# Patient Record
Sex: FEMALE | Race: BLACK OR AFRICAN AMERICAN | HISPANIC OR LATINO | Employment: FULL TIME | URBAN - METROPOLITAN AREA
[De-identification: names, ages, dates, MRNs, and addresses within clinical notes are randomized per-mention and may not be internally consistent; named-entity substitution may affect disease eponyms.]

---

## 2020-03-05 ENCOUNTER — HOSPITAL ENCOUNTER (EMERGENCY)
Facility: HOSPITAL | Age: 30
Discharge: HOME | End: 2020-03-05
Attending: EMERGENCY MEDICINE | Admitting: EMERGENCY MEDICINE

## 2020-03-05 ENCOUNTER — APPOINTMENT (OUTPATIENT)
Dept: GENERAL RADIOLOGY | Facility: HOSPITAL | Age: 30
End: 2020-03-05

## 2020-03-05 ENCOUNTER — APPOINTMENT (OUTPATIENT)
Dept: CT IMAGING | Facility: HOSPITAL | Age: 30
End: 2020-03-05

## 2020-03-05 VITALS
HEART RATE: 65 BPM | HEIGHT: 68 IN | TEMPERATURE: 97.8 F | DIASTOLIC BLOOD PRESSURE: 89 MMHG | RESPIRATION RATE: 17 BRPM | OXYGEN SATURATION: 99 % | SYSTOLIC BLOOD PRESSURE: 113 MMHG | WEIGHT: 292 LBS | BODY MASS INDEX: 44.25 KG/M2

## 2020-03-05 DIAGNOSIS — J82.83 EOSINOPHILIC ASTHMA: Primary | ICD-10-CM

## 2020-03-05 LAB
ALBUMIN SERPL-MCNC: 3.8 G/DL (ref 3.5–5.2)
ALBUMIN/GLOB SERPL: 0.9 G/DL
ALP SERPL-CCNC: 200 U/L (ref 39–117)
ALT SERPL W P-5'-P-CCNC: 14 U/L (ref 1–33)
ANION GAP SERPL CALCULATED.3IONS-SCNC: 12.1 MMOL/L (ref 5–15)
ANISOCYTOSIS BLD QL: NORMAL
AST SERPL-CCNC: 16 U/L (ref 1–32)
BASOPHILS # BLD AUTO: 0.05 10*3/MM3 (ref 0–0.2)
BASOPHILS NFR BLD AUTO: 0.4 % (ref 0–1.5)
BILIRUB SERPL-MCNC: 0.5 MG/DL (ref 0.2–1.2)
BUN BLD-MCNC: 6 MG/DL (ref 6–20)
BUN/CREAT SERPL: 6.5 (ref 7–25)
CALCIUM SPEC-SCNC: 9.5 MG/DL (ref 8.6–10.5)
CHLORIDE SERPL-SCNC: 100 MMOL/L (ref 98–107)
CO2 SERPL-SCNC: 23.9 MMOL/L (ref 22–29)
CREAT BLD-MCNC: 0.93 MG/DL (ref 0.57–1)
DEPRECATED RDW RBC AUTO: 45.3 FL (ref 37–54)
EOSINOPHIL # BLD AUTO: 5.02 10*3/MM3 (ref 0–0.4)
EOSINOPHIL NFR BLD AUTO: 44.4 % (ref 0.3–6.2)
ERYTHROCYTE [DISTWIDTH] IN BLOOD BY AUTOMATED COUNT: 15.5 % (ref 12.3–15.4)
GFR SERPL CREATININE-BSD FRML MDRD: 85 ML/MIN/1.73
GLOBULIN UR ELPH-MCNC: 4.3 GM/DL
GLUCOSE BLD-MCNC: 103 MG/DL (ref 65–99)
HCT VFR BLD AUTO: 38.1 % (ref 34–46.6)
HGB BLD-MCNC: 11.7 G/DL (ref 12–15.9)
IMM GRANULOCYTES # BLD AUTO: 0.05 10*3/MM3 (ref 0–0.05)
IMM GRANULOCYTES NFR BLD AUTO: 0.4 % (ref 0–0.5)
LARGE PLATELETS: NORMAL
LYMPHOCYTES # BLD AUTO: 3.24 10*3/MM3 (ref 0.7–3.1)
LYMPHOCYTES NFR BLD AUTO: 28.7 % (ref 19.6–45.3)
MCH RBC QN AUTO: 24.8 PG (ref 26.6–33)
MCHC RBC AUTO-ENTMCNC: 30.7 G/DL (ref 31.5–35.7)
MCV RBC AUTO: 80.7 FL (ref 79–97)
MONOCYTES # BLD AUTO: 0.43 10*3/MM3 (ref 0.1–0.9)
MONOCYTES NFR BLD AUTO: 3.8 % (ref 5–12)
NEUTROPHILS # BLD AUTO: 2.51 10*3/MM3 (ref 1.7–7)
NEUTROPHILS NFR BLD AUTO: 22.3 % (ref 42.7–76)
NRBC BLD AUTO-RTO: 0 /100 WBC (ref 0–0.2)
PLATELET # BLD AUTO: 371 10*3/MM3 (ref 140–450)
PMV BLD AUTO: 9.6 FL (ref 6–12)
POTASSIUM BLD-SCNC: 3.7 MMOL/L (ref 3.5–5.2)
PROT SERPL-MCNC: 8.1 G/DL (ref 6–8.5)
RBC # BLD AUTO: 4.72 10*6/MM3 (ref 3.77–5.28)
SODIUM BLD-SCNC: 136 MMOL/L (ref 136–145)
WBC MORPH BLD: NORMAL
WBC NRBC COR # BLD: 11.3 10*3/MM3 (ref 3.4–10.8)

## 2020-03-05 PROCEDURE — 99283 EMERGENCY DEPT VISIT LOW MDM: CPT

## 2020-03-05 PROCEDURE — 63710000001 PREDNISONE PER 1 MG: Performed by: EMERGENCY MEDICINE

## 2020-03-05 PROCEDURE — 80053 COMPREHEN METABOLIC PANEL: CPT | Performed by: EMERGENCY MEDICINE

## 2020-03-05 PROCEDURE — 94640 AIRWAY INHALATION TREATMENT: CPT

## 2020-03-05 PROCEDURE — 99284 EMERGENCY DEPT VISIT MOD MDM: CPT | Performed by: EMERGENCY MEDICINE

## 2020-03-05 PROCEDURE — 85007 BL SMEAR W/DIFF WBC COUNT: CPT | Performed by: EMERGENCY MEDICINE

## 2020-03-05 PROCEDURE — 85025 COMPLETE CBC W/AUTO DIFF WBC: CPT | Performed by: EMERGENCY MEDICINE

## 2020-03-05 PROCEDURE — 0 IOPAMIDOL PER 1 ML: Performed by: EMERGENCY MEDICINE

## 2020-03-05 PROCEDURE — 71046 X-RAY EXAM CHEST 2 VIEWS: CPT

## 2020-03-05 PROCEDURE — 71260 CT THORAX DX C+: CPT

## 2020-03-05 RX ORDER — GUAIFENESIN 600 MG/1
1200 TABLET, EXTENDED RELEASE ORAL 2 TIMES DAILY
COMMUNITY
End: 2020-03-05

## 2020-03-05 RX ORDER — PREDNISONE 20 MG/1
20 TABLET ORAL 3 TIMES DAILY
Qty: 21 TABLET | Refills: 0 | Status: SHIPPED | OUTPATIENT
Start: 2020-03-05 | End: 2020-03-12

## 2020-03-05 RX ORDER — AMOXICILLIN AND CLAVULANATE POTASSIUM 875; 125 MG/1; MG/1
1 TABLET, FILM COATED ORAL 2 TIMES DAILY
COMMUNITY
End: 2020-03-05

## 2020-03-05 RX ORDER — IPRATROPIUM BROMIDE AND ALBUTEROL SULFATE 2.5; .5 MG/3ML; MG/3ML
3 SOLUTION RESPIRATORY (INHALATION) ONCE
Status: COMPLETED | OUTPATIENT
Start: 2020-03-05 | End: 2020-03-05

## 2020-03-05 RX ORDER — ALBUTEROL SULFATE 1.25 MG/3ML
1 SOLUTION RESPIRATORY (INHALATION) EVERY 6 HOURS PRN
Qty: 25 VIAL | Refills: 0 | Status: SHIPPED | OUTPATIENT
Start: 2020-03-05

## 2020-03-05 RX ORDER — PREDNISONE 20 MG/1
60 TABLET ORAL ONCE
Status: COMPLETED | OUTPATIENT
Start: 2020-03-05 | End: 2020-03-05

## 2020-03-05 RX ORDER — SODIUM CHLORIDE 0.9 % (FLUSH) 0.9 %
10 SYRINGE (ML) INJECTION AS NEEDED
Status: DISCONTINUED | OUTPATIENT
Start: 2020-03-05 | End: 2020-03-05 | Stop reason: HOSPADM

## 2020-03-05 RX ADMIN — IOPAMIDOL 100 ML: 755 INJECTION, SOLUTION INTRAVENOUS at 14:42

## 2020-03-05 RX ADMIN — PREDNISONE 60 MG: 20 TABLET ORAL at 14:20

## 2020-03-05 RX ADMIN — IPRATROPIUM BROMIDE AND ALBUTEROL SULFATE 3 ML: .5; 3 SOLUTION RESPIRATORY (INHALATION) at 14:10

## 2020-03-05 NOTE — ED NOTES
Pulmonology paged per Dr. Downey, call returned almost immediately.      Mandy Key  03/05/20 9244

## 2020-03-05 NOTE — ED NOTES
Pulmonology called for appointment for second time, appointment made for next Tuesday March 10th at 0845.     Mandy Key  03/05/20 1535

## 2020-03-05 NOTE — ED PROVIDER NOTES
EMERGENCY DEPARTMENT ENCOUNTER      Room Number: 6/06      HPI:    Chief complaint: Persistent cough    Location: Not applicable    Quality/Severity: Moderate    Timing/Duration: Symptoms started February 13    Modifying Factors: None    Associated Symptoms: Occasional sputum production.  One episode of posttussive syncope    Narrative: Pt is a 29 y.o. female who presents complaining of a persistent cough since February 13.  Patient relates that she started with a severe cough and was seen at a [facility] emergency department and diagnosed with a community-acquired pneumonia.  Patient was reportedly wheezing at that time and was placed on antibiotics and a steroid.  Patient presented back to the emergency room on February 18 and was treated for abdominal pain and continued cough.  Ultimately the patient did again present to the emergency department on February 24 and was admitted on an observation basis for CAP and bronchospasm.  Patient has now finished Augmentin.  Patient now complaining of frequent dry cough.  Patient never did run a fever.  Old records from the [facility] system was reviewed and chest x-ray report did note some interstitial infiltrates.  Labs also reviewed and on 1 CBC differential 26% eosinophils were noted.  I cannot find where this particular problem was addressed.      PMD: Provider, No Known    REVIEW OF SYSTEMS  Review of Systems   Constitutional: Positive for activity change and fatigue. Negative for appetite change and fever.   HENT: Negative for congestion.    Respiratory: Positive for cough (Severe and dry) and shortness of breath (Mild). Negative for wheezing.    Cardiovascular: Negative for chest pain, palpitations and leg swelling.   Gastrointestinal: Positive for vomiting (Posttussive). Negative for abdominal pain, diarrhea and nausea.   Endocrine: Negative for polydipsia.   Genitourinary: Negative for difficulty urinating, dysuria, flank pain, frequency and urgency.   Musculoskeletal:  Negative for back pain.   Skin: Negative for rash.   Neurological: Positive for syncope (Posttussive). Negative for dizziness, weakness and headaches.   Psychiatric/Behavioral: Negative for confusion.   All other systems reviewed and are negative.      PAST MEDICAL HISTORY  Active Ambulatory Problems     Diagnosis Date Noted   • No Active Ambulatory Problems     Resolved Ambulatory Problems     Diagnosis Date Noted   • No Resolved Ambulatory Problems     No Additional Past Medical History       PAST SURGICAL HISTORY  Past Surgical History:   Procedure Laterality Date   • WISDOM TOOTH EXTRACTION         FAMILY HISTORY  History reviewed. No pertinent family history.    SOCIAL HISTORY  Social History     Socioeconomic History   • Marital status: Single     Spouse name: Not on file   • Number of children: Not on file   • Years of education: Not on file   • Highest education level: Not on file   Tobacco Use   • Smoking status: Former Smoker     Types: Cigarettes     Last attempt to quit: 2020     Years since quittin.0   Substance and Sexual Activity   • Alcohol use: Yes     Comment: occasionally   • Drug use: Not Currently   • Sexual activity: Defer       ALLERGIES  Bactine [lidocaine-benzalkonium] and Oyster extract    PHYSICAL EXAM  ED Triage Vitals [20 1156]   Temp Heart Rate Resp BP SpO2   97.8 °F (36.6 °C) 59 18 145/86 97 %      Temp src Heart Rate Source Patient Position BP Location FiO2 (%)   Temporal Monitor Sitting Right arm --       Physical Exam   Constitutional: She is oriented to person, place, and time.   The patient is a 29-year-old, obese, black female who is noted to have a frequent deep, dry cough.   HENT:   Head: Normocephalic and atraumatic.   Neck: Normal range of motion. Neck supple.   Cardiovascular: Normal rate, regular rhythm and normal heart sounds.   Pulmonary/Chest: Effort normal and breath sounds normal. No respiratory distress. She has no wheezes. She has no rales.   Frequent,  deep, dry cough   Musculoskeletal: She exhibits no edema.   Neurological: She is alert and oriented to person, place, and time.   Skin: Skin is warm and dry.   Psychiatric: Judgment normal.   Anxious       LAB RESULTS  Results for orders placed or performed during the hospital encounter of 03/05/20   Comprehensive Metabolic Panel   Result Value Ref Range    Glucose 103 (H) 65 - 99 mg/dL    BUN 6 6 - 20 mg/dL    Creatinine 0.93 0.57 - 1.00 mg/dL    Sodium 136 136 - 145 mmol/L    Potassium 3.7 3.5 - 5.2 mmol/L    Chloride 100 98 - 107 mmol/L    CO2 23.9 22.0 - 29.0 mmol/L    Calcium 9.5 8.6 - 10.5 mg/dL    Total Protein 8.1 6.0 - 8.5 g/dL    Albumin 3.80 3.50 - 5.20 g/dL    ALT (SGPT) 14 1 - 33 U/L    AST (SGOT) 16 1 - 32 U/L    Alkaline Phosphatase 200 (H) 39 - 117 U/L    Total Bilirubin 0.5 0.2 - 1.2 mg/dL    eGFR  African Amer 85 >60 mL/min/1.73    Globulin 4.3 gm/dL    A/G Ratio 0.9 g/dL    BUN/Creatinine Ratio 6.5 (L) 7.0 - 25.0    Anion Gap 12.1 5.0 - 15.0 mmol/L   CBC Auto Differential   Result Value Ref Range    WBC 11.30 (H) 3.40 - 10.80 10*3/mm3    RBC 4.72 3.77 - 5.28 10*6/mm3    Hemoglobin 11.7 (L) 12.0 - 15.9 g/dL    Hematocrit 38.1 34.0 - 46.6 %    MCV 80.7 79.0 - 97.0 fL    MCH 24.8 (L) 26.6 - 33.0 pg    MCHC 30.7 (L) 31.5 - 35.7 g/dL    RDW 15.5 (H) 12.3 - 15.4 %    RDW-SD 45.3 37.0 - 54.0 fl    MPV 9.6 6.0 - 12.0 fL    Platelets 371 140 - 450 10*3/mm3    Neutrophil % 22.3 (L) 42.7 - 76.0 %    Lymphocyte % 28.7 19.6 - 45.3 %    Monocyte % 3.8 (L) 5.0 - 12.0 %    Eosinophil % 44.4 (H) 0.3 - 6.2 %    Basophil % 0.4 0.0 - 1.5 %    Immature Grans % 0.4 0.0 - 0.5 %    Neutrophils, Absolute 2.51 1.70 - 7.00 10*3/mm3    Lymphocytes, Absolute 3.24 (H) 0.70 - 3.10 10*3/mm3    Monocytes, Absolute 0.43 0.10 - 0.90 10*3/mm3    Eosinophils, Absolute 5.02 (H) 0.00 - 0.40 10*3/mm3    Basophils, Absolute 0.05 0.00 - 0.20 10*3/mm3    Immature Grans, Absolute 0.05 0.00 - 0.05 10*3/mm3    nRBC 0.0 0.0 - 0.2 /100 WBC    Scan Slide   Result Value Ref Range    Anisocytosis Slight/1+ None Seen    WBC Morphology Normal Normal    Large Platelets Slight/1+ None Seen         I ordered the above labs and reviewed the results    RADIOLOGY  Xr Chest 2 View    Result Date: 3/5/2020  Narrative: CHEST 2 VIEWS 03/05/2020  HISTORY: Persistent productive cough for one month. Smoking history.  FINDINGS: The heart is normal in size. The lungs are clear. There are no pleural effusions.      Impression: Normal chest.  This report was finalized on 3/5/2020 12:50 PM by Dr. Stuart Erwin MD.      Ct Chest With Contrast    Result Date: 3/5/2020  Narrative: CT SCAN OF THE CHEST WITH CONTRAST 03/05/2020  HISTORY: Shortness of breath, cough and chest congestion for one month. Smoking history. Persistent cough with marked eosinophilia today.  TECHNIQUE: Spiral CT was performed through the chest following intravenous contrast administration. Radiation dose reduction techniques included automated exposure control or exposure modulation based on body size. Radiation audit for CT and nuclear cardiology exams in the last 12 months: 0.  FINDINGS: There is mild diffuse centrilobular emphysema. No pulmonary mass or acute infiltrate is seen. The heart is normal in size. There is some adenopathy in the axillary regions bilaterally. The largest discrete lymph node in the right axilla measures 2.7 cm x 2.5 cm. The largest discrete lymph node in the left axilla measures 1.8 cm x 1.1 cm. Clinical correlation recommended. No additional significant thoracic lymphadenopathy is seen. There are no pleural effusions. Images of the upper abdomen are normal.      Impression: 1. Mild emphysematous changes. No pulmonary mass or acute infiltrate. 2. Bilateral axillary lymphadenopathy as detailed above, right greater than left. Clinical correlation recommended.  This report was finalized on 3/5/2020 3:04 PM by Dr. Stuart Erwin MD.        I ordered the above radiologic testing and  reviewed the results    PROCEDURES  Procedures      PROGRESS AND CONSULTS  ED Course as of Mar 05 1609   Thu Mar 05, 2020   1337 Marked eosinophilia with a absolute eosinophil count of nearly 5000.  Call to pulmonology has been placed.  Patient informed of all pertinent results.  Patient still with persistent dry cough.    [ML]   1411 Dr. King, pulmonology, spoken to and recommended obtaining chest CT and putting the patient on steroids.  Follow-up in clinic in 1 week.  The radiologist, Dr. Erwin, was spoken to concerning rationale for chest CT.  Patient in agreement with care plan.    [ML]   1558 Appointment has been made to have the patient follow-up in the office on March 10.  The patient has been provided with a nebulizer with instructions to use it every 6-8 hours as needed for persistent coughing, wheezing or shortness of breath.  Patient will be placed on prednisone 20 mg 3 times a day for 1 week.  On 2 separate occasions the patient was strongly advised to not start smoking again.  Diagnosis of eosinophilic asthma discussed with patient along with treatment plan, expectations and warnings.    [ML]      ED Course User Index  [ML] Milotn Downey MD           MEDICAL DECISION MAKING  Results were reviewed/discussed with the patient and they were also made aware of online access. Pt also made aware that some labs, such as cultures, will not be resulted during ER visit and follow up with PMD is necessary.     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     HYDROcodone-homatropine 5-1.5 MG/5ML  syrup  Commonly known as:  HYCODAN  Take 5 mL by mouth Every 4 (Four) Hours As Needed for Cough.     predniSONE 20 MG tablet  Commonly known as:  DELTASONE  Take 1 tablet by mouth 3 (Three) Times a Day for 7 days.        Stop    amoxicillin-clavulanate 875-125 MG per tablet  Commonly known as:  AUGMENTIN     guaiFENesin 600 MG 12 hr tablet  Commonly known as:  MUCINEX     LACTOBACILLUS PO          Follow-up Information     Fabrice Rosen MD.    Specialty:  Pulmonary Disease  Why:  On Tuesday as scheduled  Contact information:  [address] [facility] [address]  [facility] [address] [phone]                      Milton Downey MD  03/05/20 9669

## 2020-03-05 NOTE — ED NOTES
Pt was crying when this RN arrived for d/c review.  She said she was talking to her mother and they are both very upset with the diagnosis.   Reviewed plan, double checked that pt understood where to go for f/u.  Explained that RT is on their way to teach use of nebulizer.  Informed primary RN.     Isabella Cotter RN  03/05/20 [id number]

## 2020-03-05 NOTE — ED NOTES
"Pt stated this is the fourth time she has been to the doctor since February 13 2020 for same s/s.  She stated she has been tested for flu and \"I do not have the flu\"     Isabella Cotter RN  03/05/20 1200    "

## 2020-03-05 NOTE — ED NOTES
Spoke with Raymundo at pulmonology office, stated pt will have to speak with the billing office prior to making appointment d/t not having insurance. Raymundo instructed me to tell pt to call office and ask for her (Raymundo) directly. She will then direct the pt to billing and will make an appointment from there.     Mandy Key  03/05/20 6254

## 2020-03-05 NOTE — ED NOTES
Pt stated she has a nebulizer but has not yet been instructed how to use it.  Called RT.  Alex, RT said she will be down to teach pt.     Isabella Cotter RN  03/05/20 3022

## 2023-12-11 ENCOUNTER — LAB REQUISITION (OUTPATIENT)
Dept: LAB | Facility: CLINIC | Age: 33
End: 2023-12-11

## 2023-12-11 PROCEDURE — 88313 SPECIAL STAINS GROUP 2: CPT | Mod: TC | Performed by: PATHOLOGY

## 2023-12-13 LAB
PATH REPORT.COMMENTS IMP SPEC: NORMAL
PATH REPORT.FINAL DX SPEC: NORMAL
PATH REPORT.GROSS SPEC: NORMAL
PATH REPORT.MICROSCOPIC SPEC OTHER STN: NORMAL
PATH REPORT.RELEVANT HX SPEC: NORMAL
PATH REPORT.RELEVANT HX SPEC: NORMAL
PATH REPORT.SITE OF ORIGIN SPEC: NORMAL